# Patient Record
Sex: FEMALE | Race: WHITE | Employment: PART TIME | URBAN - METROPOLITAN AREA
[De-identification: names, ages, dates, MRNs, and addresses within clinical notes are randomized per-mention and may not be internally consistent; named-entity substitution may affect disease eponyms.]

---

## 2018-06-26 ENCOUNTER — HOSPITAL ENCOUNTER (OUTPATIENT)
Dept: GENERAL RADIOLOGY | Age: 58
Discharge: HOME | End: 2018-06-28
Payer: COMMERCIAL

## 2018-06-26 DIAGNOSIS — Z12.39 BREAST CANCER SCREENING: ICD-10-CM

## 2018-06-26 PROCEDURE — 77067 SCR MAMMO BI INCL CAD: CPT

## 2018-07-02 ENCOUNTER — HOSPITAL ENCOUNTER (OUTPATIENT)
Age: 58
Discharge: HOME | End: 2018-07-04
Payer: COMMERCIAL

## 2018-07-02 PROCEDURE — 88175 CYTOPATH C/V AUTO FLUID REDO: CPT

## 2019-06-07 ENCOUNTER — TELEPHONE (OUTPATIENT)
Dept: ORTHOPEDIC SURGERY | Age: 59
End: 2019-06-07

## 2019-06-07 DIAGNOSIS — R52 PAIN: Primary | ICD-10-CM

## 2019-06-10 ENCOUNTER — OFFICE VISIT (OUTPATIENT)
Dept: ORTHOPEDIC SURGERY | Age: 59
End: 2019-06-10
Payer: COMMERCIAL

## 2019-06-10 VITALS
HEIGHT: 61 IN | BODY MASS INDEX: 28.32 KG/M2 | SYSTOLIC BLOOD PRESSURE: 120 MMHG | DIASTOLIC BLOOD PRESSURE: 80 MMHG | HEART RATE: 81 BPM | TEMPERATURE: 97.8 F | RESPIRATION RATE: 18 BRPM | WEIGHT: 150 LBS

## 2019-06-10 DIAGNOSIS — M75.21 BICEPS TENDINITIS OF RIGHT UPPER EXTREMITY: Primary | ICD-10-CM

## 2019-06-10 PROCEDURE — 99203 OFFICE O/P NEW LOW 30 MIN: CPT | Performed by: ORTHOPAEDIC SURGERY

## 2019-06-10 NOTE — PATIENT INSTRUCTIONS
Patient Education        Biceps Tendinitis: Exercises  Your Care Instructions  Here are some examples of typical rehabilitation exercises for your condition. Start each exercise slowly. Ease off the exercise if you start to have pain. Your doctor or physical therapist will tell you when you can start these exercises and which ones will work best for you. How to do the exercises  Biceps stretch    1. Stand and hold your affected arm out to the side, with your hand at about hip level. 2. Gently bend your wrist back so that your fingers point down toward the floor. 3. You may also do this next to a wall and rest your fingers on the wall. 4. For more of a stretch, bend your head to the opposite side of your affected arm. 5. Hold for 15 to 30 seconds. 6. Repeat 2 to 4 times. Resisted supination    1. Sit leaning forward with your legs slightly spread. Then place your forearm on your thigh with your hand and affected wrist in front of your knee. 2. Grasp one end of an exercise band with your palm down, and step on the other end. 3. Keeping your wrist straight, roll your palm outward and away from your thigh for a count of 2, then slowly move your wrist back to the starting position to a count of 5.  4. Repeat 8 to 12 times. Resisted elbow flexion    1. Using your affected arm, hold one end of an elastic band in your hand. 2. Place the other end of the band under your foot on the same side of your body as your affected arm. 3. Slowly bend your elbow and bring your hand toward your shoulder. Your palm and the underside of your wrist should be facing up as you pull the band toward your shoulder. Count to 2 as you pull up. 4. Relax and slowly return to your starting position. Count to 5 as you return to the start. 5. Repeat 8 to 12 times. Resisted elbow flexion at shoulder level    1. Tie the ends of an exercise band together to form a loop.  Attach one end of the loop to a secure object or shut a door on

## 2019-06-10 NOTE — PROGRESS NOTES
Department of Orthopedic Surgery  History and Physical      CHIEF COMPLAINT:  Right arm pain    HISTORY OF PRESENT ILLNESS:                The patient is a RHD 62 y.o. female who presents with right arm pain. Patient states that she has had right arm pain for the last year. She states about a year ago she had a systemic cortisone injection by her primary care physician which took care of her pain. Her pain returned about 2 months ago where she received another systemic cortisone injection. She states her pain is under control at this point. She reports pain along her biceps tendon distally. She states when she has pain is very painful to flex and extend her elbow. She has worn a sling to help her discomfort when it is present. She denies numbness or tingling. She has no symptoms in the shoulder. Past Medical History:        Diagnosis Date    Hypertension      Past Surgical History:        Procedure Laterality Date    COLONOSCOPY  2012    Dr. Leticia Wasserman  2007     Current Medications:   No current facility-administered medications for this visit. Allergies:  Patient has no known allergies. Social History:   TOBACCO:   reports that she has never smoked. She has never used smokeless tobacco.  ETOH:   reports that she drinks about 2.4 oz of alcohol per week. DRUGS:   reports that she does not use drugs.   ACTIVITIES OF DAILY LIVING:    OCCUPATION:    Family History:       Problem Relation Age of Onset    Cancer Mother         breast cancer       REVIEW OF SYSTEMS:  CONSTITUTIONAL:  negative  EYES:  negative  HEENT:  negative  RESPIRATORY:  negative  CARDIOVASCULAR:  HTN  GASTROINTESTINAL:  negative  GENITOURINARY:  negative  INTEGUMENT/BREAST:  negative  HEMATOLOGIC/LYMPHATIC:  negative  ALLERGIC/IMMUNOLOGIC:  negative  ENDOCRINE:  negative  MUSCULOSKELETAL:  pain  NEUROLOGICAL:  negative  BEHAVIOR/PSYCH:  negative    PHYSICAL EXAM:    VITALS:  /80 (Site: Left Upper Arm, Position: Sitting)   Pulse 81   Temp 97.8 °F (36.6 °C) (Oral)   Resp 18   Ht 5' 1\" (1.549 m)   Wt 150 lb (68 kg)   BMI 28.34 kg/m²   CONSTITUTIONAL:  awake, alert, cooperative, no apparent distress, and appears stated age  EYES:  Lids and lashes normal, pupils equal, round and reactive to light, extra ocular muscles intact, sclera clear, conjunctiva normal  ENT:  Normocephalic, without obvious abnormality, atraumatic, sinuses nontender on palpation, external ears without lesions, oral pharynx with moist mucus membranes, tonsils without erythema or exudates, gums normal and good dentition. NECK:  Supple, symmetrical, trachea midline, no adenopathy, thyroid symmetric, not enlarged and no tenderness, skin normal  NEUROLOGIC:  Awake, alert, oriented to name, place and time. Cranial nerves II-XII are grossly intact. Motor is 5 out of 5 bilaterally. Sensory is intact.  gait is normal.  MUSCULOSKELETAL:    Right upper extremity: non-tender about the shoulder with full ROM. - drop arm test. Mild tenderness over the biceps tendon over the proximal humerus. Distal insertion intact. Full elbow ROM with no pain. - tenderness over the medial and lateral epicondyles. - tinels of the cubital tunnel. Median, ulnar, radial n intact to light touch. Brisk capillary refill. Gross motor 5/5. DATA:    CBC: No results found for: WBC, RBC, HGB, HCT, MCV, MCH, MCHC, RDW, PLT, MPV  PT/INR:  No results found for: PROTIME, INR    Radiology Review:  X-rays of the right shoulder were obtained today in the office and reviewed with the patient. 3 views: AP/scapular Y-view/axial demonstrate no acute fractures or dislocations. Mild GH arthritis. Previously healed fracture to the clavicle  Impression: no acute fractures or dislocations. X-rays of the right elbow were obtained today in the office and reviewed with the patient.  2 views: AP/lateral demonstrate no acute fractures or dislocations  Impression: no acute fractures or dislocations    IMPRESSION:  · Right arm tendinitis     PLAN:  Discussed findings with the patient. Patient states her steroid injection is still providing her relief. Recommended antiinflammatories and exercises as needed. Follow up PRN. I have seen and evaluated the patient and agree with the above assessment and plan on today's visit. I have performed the key components of the history and physical examination with significant findings of right biceps tendinitis. I concur with the findings and plan as documented.     Keli Licea MD  6/10/2019

## 2019-06-27 ENCOUNTER — HOSPITAL ENCOUNTER (OUTPATIENT)
Dept: GENERAL RADIOLOGY | Age: 59
Discharge: HOME | End: 2019-06-29
Payer: COMMERCIAL

## 2019-06-27 DIAGNOSIS — Z12.39 BREAST CANCER SCREENING: ICD-10-CM

## 2019-06-27 DIAGNOSIS — R92.2 DENSE BREAST: ICD-10-CM

## 2019-06-27 PROCEDURE — 76641 ULTRASOUND BREAST COMPLETE: CPT

## 2019-06-27 PROCEDURE — 77063 BREAST TOMOSYNTHESIS BI: CPT

## 2019-07-03 ENCOUNTER — HOSPITAL ENCOUNTER (OUTPATIENT)
Age: 59
Discharge: HOME | End: 2019-07-05
Payer: COMMERCIAL

## 2019-07-03 PROCEDURE — 88175 CYTOPATH C/V AUTO FLUID REDO: CPT

## 2019-07-03 PROCEDURE — 87624 HPV HI-RISK TYP POOLED RSLT: CPT

## 2019-07-12 LAB
HPV SAMPLE: NORMAL
HPV TYPE 16: NOT DETECTED
HPV TYPE 18: NOT DETECTED
HPV, HIGH RISK OTHER: NOT DETECTED
INTERPRETATION: NORMAL
SOURCE: NORMAL

## 2020-07-06 ENCOUNTER — HOSPITAL ENCOUNTER (OUTPATIENT)
Age: 60
Discharge: HOME | End: 2020-07-08
Payer: COMMERCIAL

## 2020-07-06 PROCEDURE — 88175 CYTOPATH C/V AUTO FLUID REDO: CPT

## 2020-07-30 ENCOUNTER — HOSPITAL ENCOUNTER (OUTPATIENT)
Dept: GENERAL RADIOLOGY | Age: 60
Discharge: HOME | End: 2020-08-01
Payer: COMMERCIAL

## 2020-07-30 PROCEDURE — 77063 BREAST TOMOSYNTHESIS BI: CPT

## 2021-08-02 ENCOUNTER — HOSPITAL ENCOUNTER (OUTPATIENT)
Dept: GENERAL RADIOLOGY | Age: 61
Discharge: HOME | End: 2021-08-04
Payer: COMMERCIAL

## 2021-08-02 DIAGNOSIS — Z12.31 ENCOUNTER FOR SCREENING MAMMOGRAM FOR BREAST CANCER: ICD-10-CM

## 2021-08-02 DIAGNOSIS — R92.2 DENSE BREAST TISSUE: ICD-10-CM

## 2021-08-02 PROCEDURE — 76641 ULTRASOUND BREAST COMPLETE: CPT

## 2021-08-02 PROCEDURE — 77067 SCR MAMMO BI INCL CAD: CPT

## 2022-07-11 ENCOUNTER — HOSPITAL ENCOUNTER (OUTPATIENT)
Dept: GENERAL RADIOLOGY | Age: 62
Discharge: HOME | End: 2022-07-13
Payer: COMMERCIAL

## 2022-07-11 DIAGNOSIS — Z91.89 AT HIGH RISK FOR BREAST CANCER: ICD-10-CM

## 2022-07-11 DIAGNOSIS — R92.2 DENSE BREAST TISSUE: ICD-10-CM

## 2022-07-11 DIAGNOSIS — R92.8 ABNORMAL MAMMOGRAM: ICD-10-CM

## 2022-07-11 DIAGNOSIS — R92.8 ABNORMAL FINDING ON BREAST IMAGING: Primary | ICD-10-CM

## 2022-07-11 DIAGNOSIS — Z12.31 ENCOUNTER FOR SCREENING MAMMOGRAM FOR BREAST CANCER: ICD-10-CM

## 2022-07-11 PROCEDURE — 76641 ULTRASOUND BREAST COMPLETE: CPT

## 2022-07-11 PROCEDURE — 76642 ULTRASOUND BREAST LIMITED: CPT

## 2022-07-11 PROCEDURE — 77063 BREAST TOMOSYNTHESIS BI: CPT

## 2022-07-14 ENCOUNTER — HOSPITAL ENCOUNTER (OUTPATIENT)
Dept: GENERAL RADIOLOGY | Age: 62
Discharge: HOME | End: 2022-07-16
Payer: COMMERCIAL

## 2022-07-14 DIAGNOSIS — R92.8 ABNORMAL FINDING ON BREAST IMAGING: ICD-10-CM

## 2022-07-14 PROCEDURE — A4648 IMPLANTABLE TISSUE MARKER: HCPCS

## 2022-07-14 PROCEDURE — 88305 TISSUE EXAM BY PATHOLOGIST: CPT

## 2022-07-14 PROCEDURE — 77065 DX MAMMO INCL CAD UNI: CPT

## 2022-07-14 PROCEDURE — 2500000003 HC RX 250 WO HCPCS

## 2022-07-14 NOTE — PROGRESS NOTES
Met with patient prior to her breast biopsy. Instructed on role of breast navigator and on breast biopsy procedure. Instructed that results will be available in approximately 3-5 days, patient would like called with results. Provided with folder containing breast navigator's contact information, monthly breast self exam card, and post biopsy discharge instructions. Instructed to call if she has any questions or concerns about her biopsy. Verbalizes understanding.

## 2022-07-20 ENCOUNTER — TELEPHONE (OUTPATIENT)
Dept: GENERAL RADIOLOGY | Age: 62
End: 2022-07-20

## 2022-07-20 NOTE — TELEPHONE ENCOUNTER
Shanda call to inquire about her recent right breast biopsy results which she saw in her [employer] account. Instructed that the pathology report from her breast biopsy indicates a benign finding. Instructed that the performing radiologist  will review her breast images and the pathology results for concordance. Instructed that she should plan on annual mammogram.  Instructed that we will notify her if her results are determined to be discordant or if other follow up recommendations are made. She verbalizes understanding.  Electronically signed by Madisyn Land RN, BSN on 7/20/2022 at 4:36 PM

## 2023-04-10 PROBLEM — M79.672 LEFT FOOT PAIN: Status: ACTIVE | Noted: 2023-04-10

## 2023-04-10 PROBLEM — R26.2 DIFFICULTY WALKING: Status: ACTIVE | Noted: 2023-04-10

## 2023-04-10 PROBLEM — M79.89 PALPABLE MASS OF SOFT TISSUE OF FOOT: Status: ACTIVE | Noted: 2023-04-10

## 2023-05-04 ENCOUNTER — CLINICAL DOCUMENTATION (OUTPATIENT)
Dept: GENERAL RADIOLOGY | Age: 63
End: 2023-05-04

## 2023-05-04 DIAGNOSIS — M79.621 AXILLARY PAIN, RIGHT: Primary | ICD-10-CM

## 2023-05-04 NOTE — PROGRESS NOTES
Patient called today stating she has right axillary pain. Put in orders for provider to co-sign, orders given to schedulers to move up imaging appointment.

## 2023-05-08 ENCOUNTER — OFFICE VISIT (OUTPATIENT)
Dept: PODIATRY | Age: 63
End: 2023-05-08
Payer: COMMERCIAL

## 2023-05-08 VITALS — WEIGHT: 144 LBS | BODY MASS INDEX: 27.19 KG/M2 | HEIGHT: 61 IN

## 2023-05-08 DIAGNOSIS — B35.3 TINEA PEDIS OF RIGHT FOOT: ICD-10-CM

## 2023-05-08 DIAGNOSIS — M79.89 PALPABLE MASS OF SOFT TISSUE OF FOOT: Primary | ICD-10-CM

## 2023-05-08 DIAGNOSIS — M79.672 LEFT FOOT PAIN: ICD-10-CM

## 2023-05-08 PROCEDURE — 99213 OFFICE O/P EST LOW 20 MIN: CPT | Performed by: PODIATRIST

## 2023-05-08 NOTE — PROGRESS NOTES
23     Shanda Abad    : 1960   Sex: female    Age: 58 y.o. Patient's PCP/Provider is:  Pilar Davenport MD    Subjective:  Patient is seen today for follow-up regarding continued care regarding painful soft tissue mass left great toe. Patient presents today to discuss MRI results. Patient also having some issues with chronic tinea issue right fourth webspace region. No other additional abnormalities noted. Chief Complaint   Patient presents with    Follow-up     MRI Results        ROS:  Const: Positives and pertinent negatives as per HPI. Musculo: Denies symptoms other than stated above. Neuro: Denies symptoms other than stated above. Skin: Denies symptoms other than stated above. Current Medications:    Current Outpatient Medications:     nystatin-triamcinolone (MYCOLOG II) 110703-3.1 UNIT/GM-% cream, Apply topically 2 times daily. , Disp: 60 g, Rfl: 3    BENICAR HCT 20-12.5 MG per tablet, Take 1 tablet by mouth daily, Disp: , Rfl:     Allergies:  No Known Allergies    Vitals:    23 1531   Weight: 144 lb (65.3 kg)   Height: 5' 1\" (1.549 m)       Exam:  Neurovascular status grossly intact bilateral foot. Palpable mass noted medial IPJ region left great toe with tenderness noted to palpation. No overlying hyperpigmentation, skin abrasion, or any signs of infection noted left great toe. Mild maceration noted fourth webspace right foot without skin fissuring or signs of bacterial infection noted. No edema noted right forefoot region. Diagnostic Studies:     XR FOOT LEFT (MIN 3 VIEWS)    Result Date: 2023  EXAMINATION: THREE XRAY VIEWS OF THE LEFT FOOT 4/10/2023 11:52 am COMPARISON: None. HISTORY: ORDERING SYSTEM PROVIDED HISTORY: Left foot pain TECHNOLOGIST PROVIDED HISTORY: Standing unless patient unable to stand FINDINGS: Three views of left foot reveal no evidence of acute bony abnormality. Minimal degenerative changes seen within the 1st metatarsophalangeal joint.

## 2023-06-30 ENCOUNTER — PREP FOR PROCEDURE (OUTPATIENT)
Dept: PODIATRY | Age: 63
End: 2023-06-30

## 2023-06-30 ENCOUNTER — TELEPHONE (OUTPATIENT)
Dept: PODIATRY | Age: 63
End: 2023-06-30

## 2023-06-30 PROBLEM — M79.675 PAIN OF LEFT GREAT TOE: Status: ACTIVE | Noted: 2023-06-30

## 2023-06-30 PROBLEM — M89.8X7 EXOSTOSIS OF TOE: Status: ACTIVE | Noted: 2023-06-30

## 2023-07-17 ENCOUNTER — HOSPITAL ENCOUNTER (OUTPATIENT)
Age: 63
Discharge: HOME | End: 2023-07-17
Payer: COMMERCIAL

## 2023-07-17 DIAGNOSIS — M79.675 PAIN OF LEFT GREAT TOE: ICD-10-CM

## 2023-07-17 LAB
ANION GAP SERPL CALCULATED.3IONS-SCNC: 9 MMOL/L (ref 7–16)
BUN SERPL-MCNC: 16 MG/DL (ref 6–23)
CALCIUM SERPL-MCNC: 9.5 MG/DL (ref 8.6–10.2)
CHLORIDE SERPL-SCNC: 103 MMOL/L (ref 98–107)
CO2 SERPL-SCNC: 27 MMOL/L (ref 22–29)
CREAT SERPL-MCNC: 0.8 MG/DL (ref 0.5–1)
GFR SERPL CREATININE-BSD FRML MDRD: >60 ML/MIN/1.73M2
GLUCOSE SERPL-MCNC: 93 MG/DL (ref 74–99)
POTASSIUM SERPL-SCNC: 4 MMOL/L (ref 3.5–5)
SODIUM SERPL-SCNC: 139 MMOL/L (ref 132–146)

## 2023-07-17 PROCEDURE — 36415 COLL VENOUS BLD VENIPUNCTURE: CPT

## 2023-07-17 PROCEDURE — 80048 BASIC METABOLIC PNL TOTAL CA: CPT

## 2023-07-19 ENCOUNTER — ANESTHESIA EVENT (OUTPATIENT)
Dept: OPERATING ROOM | Age: 63
End: 2023-07-19
Payer: COMMERCIAL

## 2023-07-19 RX ORDER — SODIUM CHLORIDE 0.9 % (FLUSH) 0.9 %
5-40 SYRINGE (ML) INJECTION EVERY 12 HOURS SCHEDULED
Status: CANCELLED | OUTPATIENT
Start: 2023-07-19

## 2023-07-19 RX ORDER — SODIUM CHLORIDE 0.9 % (FLUSH) 0.9 %
5-40 SYRINGE (ML) INJECTION PRN
Status: CANCELLED | OUTPATIENT
Start: 2023-07-19

## 2023-07-19 RX ORDER — SODIUM CHLORIDE 9 MG/ML
INJECTION, SOLUTION INTRAVENOUS PRN
Status: CANCELLED | OUTPATIENT
Start: 2023-07-19

## 2023-07-19 ASSESSMENT — LIFESTYLE VARIABLES: SMOKING_STATUS: 0

## 2023-07-20 ENCOUNTER — ANESTHESIA (OUTPATIENT)
Dept: OPERATING ROOM | Age: 63
End: 2023-07-20
Payer: COMMERCIAL

## 2023-07-20 ENCOUNTER — HOSPITAL ENCOUNTER (OUTPATIENT)
Age: 63
Setting detail: OUTPATIENT SURGERY
Discharge: HOME | End: 2023-07-20
Attending: PODIATRIST | Admitting: PODIATRIST
Payer: COMMERCIAL

## 2023-07-20 ENCOUNTER — HOSPITAL ENCOUNTER (OUTPATIENT)
Dept: OPERATING ROOM | Age: 63
Setting detail: OUTPATIENT SURGERY
Discharge: HOME | End: 2023-07-20
Attending: PODIATRIST
Payer: COMMERCIAL

## 2023-07-20 VITALS
TEMPERATURE: 97.1 F | RESPIRATION RATE: 16 BRPM | WEIGHT: 149 LBS | SYSTOLIC BLOOD PRESSURE: 98 MMHG | HEART RATE: 52 BPM | BODY MASS INDEX: 28.13 KG/M2 | DIASTOLIC BLOOD PRESSURE: 58 MMHG | OXYGEN SATURATION: 97 % | HEIGHT: 61 IN

## 2023-07-20 DIAGNOSIS — M89.8X7 EXOSTOSIS OF TOE: ICD-10-CM

## 2023-07-20 DIAGNOSIS — M79.675 PAIN OF LEFT GREAT TOE: ICD-10-CM

## 2023-07-20 DIAGNOSIS — M79.89 MASS OF SOFT TISSUE OF FOOT: ICD-10-CM

## 2023-07-20 DIAGNOSIS — M79.672 FOOT PAIN, LEFT: ICD-10-CM

## 2023-07-20 PROCEDURE — 7100000011 HC PHASE II RECOVERY - ADDTL 15 MIN: Performed by: PODIATRIST

## 2023-07-20 PROCEDURE — 2580000003 HC RX 258: Performed by: PODIATRIST

## 2023-07-20 PROCEDURE — 3700000001 HC ADD 15 MINUTES (ANESTHESIA): Performed by: PODIATRIST

## 2023-07-20 PROCEDURE — 3700000000 HC ANESTHESIA ATTENDED CARE: Performed by: PODIATRIST

## 2023-07-20 PROCEDURE — 7100000010 HC PHASE II RECOVERY - FIRST 15 MIN: Performed by: PODIATRIST

## 2023-07-20 PROCEDURE — 2709999900 HC NON-CHARGEABLE SUPPLY: Performed by: PODIATRIST

## 2023-07-20 PROCEDURE — 6360000002 HC RX W HCPCS: Performed by: PODIATRIST

## 2023-07-20 PROCEDURE — 3600000014 HC SURGERY LEVEL 4 ADDTL 15MIN: Performed by: PODIATRIST

## 2023-07-20 PROCEDURE — 6360000002 HC RX W HCPCS: Performed by: NURSE ANESTHETIST, CERTIFIED REGISTERED

## 2023-07-20 PROCEDURE — 2500000003 HC RX 250 WO HCPCS: Performed by: NURSE ANESTHETIST, CERTIFIED REGISTERED

## 2023-07-20 PROCEDURE — 2580000003 HC RX 258: Performed by: ANESTHESIOLOGY

## 2023-07-20 PROCEDURE — 3600000004 HC SURGERY LEVEL 4 BASE: Performed by: PODIATRIST

## 2023-07-20 PROCEDURE — 28124 PARTIAL REMOVAL OF TOE: CPT | Performed by: PODIATRIST

## 2023-07-20 PROCEDURE — 88304 TISSUE EXAM BY PATHOLOGIST: CPT

## 2023-07-20 PROCEDURE — 28041 EXC FOOT/TOE TUM DEP 1.5CM/>: CPT | Performed by: PODIATRIST

## 2023-07-20 RX ORDER — SODIUM CHLORIDE 9 MG/ML
INJECTION, SOLUTION INTRAVENOUS PRN
Status: DISCONTINUED | OUTPATIENT
Start: 2023-07-20 | End: 2023-07-20 | Stop reason: HOSPADM

## 2023-07-20 RX ORDER — BUPIVACAINE HYDROCHLORIDE 5 MG/ML
INJECTION, SOLUTION PERINEURAL PRN
Status: DISCONTINUED | OUTPATIENT
Start: 2023-07-20 | End: 2023-07-20 | Stop reason: ALTCHOICE

## 2023-07-20 RX ORDER — FENTANYL CITRATE 50 UG/ML
INJECTION, SOLUTION INTRAMUSCULAR; INTRAVENOUS PRN
Status: DISCONTINUED | OUTPATIENT
Start: 2023-07-20 | End: 2023-07-20 | Stop reason: SDUPTHER

## 2023-07-20 RX ORDER — PROPOFOL 10 MG/ML
INJECTION, EMULSION INTRAVENOUS CONTINUOUS PRN
Status: DISCONTINUED | OUTPATIENT
Start: 2023-07-20 | End: 2023-07-20 | Stop reason: SDUPTHER

## 2023-07-20 RX ORDER — HYDROCODONE BITARTRATE AND ACETAMINOPHEN 5; 325 MG/1; MG/1
1 TABLET ORAL EVERY 6 HOURS PRN
Qty: 12 TABLET | Refills: 0 | Status: SHIPPED | OUTPATIENT
Start: 2023-07-20 | End: 2023-07-23

## 2023-07-20 RX ORDER — SODIUM CHLORIDE, SODIUM LACTATE, POTASSIUM CHLORIDE, CALCIUM CHLORIDE 600; 310; 30; 20 MG/100ML; MG/100ML; MG/100ML; MG/100ML
INJECTION, SOLUTION INTRAVENOUS CONTINUOUS
Status: DISCONTINUED | OUTPATIENT
Start: 2023-07-20 | End: 2023-07-20 | Stop reason: HOSPADM

## 2023-07-20 RX ORDER — KETAMINE HYDROCHLORIDE 50 MG/ML
INJECTION, SOLUTION, CONCENTRATE INTRAMUSCULAR; INTRAVENOUS PRN
Status: DISCONTINUED | OUTPATIENT
Start: 2023-07-20 | End: 2023-07-20 | Stop reason: SDUPTHER

## 2023-07-20 RX ORDER — KETOROLAC TROMETHAMINE 30 MG/ML
INJECTION, SOLUTION INTRAMUSCULAR; INTRAVENOUS PRN
Status: DISCONTINUED | OUTPATIENT
Start: 2023-07-20 | End: 2023-07-20 | Stop reason: SDUPTHER

## 2023-07-20 RX ORDER — SODIUM CHLORIDE 0.9 % (FLUSH) 0.9 %
5-40 SYRINGE (ML) INJECTION PRN
Status: DISCONTINUED | OUTPATIENT
Start: 2023-07-20 | End: 2023-07-20 | Stop reason: HOSPADM

## 2023-07-20 RX ORDER — MIDAZOLAM HYDROCHLORIDE 1 MG/ML
INJECTION INTRAMUSCULAR; INTRAVENOUS PRN
Status: DISCONTINUED | OUTPATIENT
Start: 2023-07-20 | End: 2023-07-20 | Stop reason: SDUPTHER

## 2023-07-20 RX ORDER — SODIUM CHLORIDE 0.9 % (FLUSH) 0.9 %
5-40 SYRINGE (ML) INJECTION EVERY 12 HOURS SCHEDULED
Status: DISCONTINUED | OUTPATIENT
Start: 2023-07-20 | End: 2023-07-20 | Stop reason: HOSPADM

## 2023-07-20 RX ADMIN — KETAMINE HYDROCHLORIDE 20 MG: 50 INJECTION INTRAMUSCULAR; INTRAVENOUS at 10:33

## 2023-07-20 RX ADMIN — PROPOFOL INJECTABLE EMULSION 100 MCG/KG/MIN: 10 INJECTION, EMULSION INTRAVENOUS at 10:32

## 2023-07-20 RX ADMIN — SODIUM CHLORIDE, POTASSIUM CHLORIDE, SODIUM LACTATE AND CALCIUM CHLORIDE: 600; 310; 30; 20 INJECTION, SOLUTION INTRAVENOUS at 10:15

## 2023-07-20 RX ADMIN — FENTANYL CITRATE 25 MCG: 50 INJECTION INTRAMUSCULAR; INTRAVENOUS at 10:53

## 2023-07-20 RX ADMIN — KETOROLAC TROMETHAMINE 30 MG: 30 INJECTION, SOLUTION INTRAMUSCULAR; INTRAVENOUS at 10:57

## 2023-07-20 RX ADMIN — MIDAZOLAM 2 MG: 1 INJECTION INTRAMUSCULAR; INTRAVENOUS at 10:24

## 2023-07-20 RX ADMIN — CEFAZOLIN 2000 MG: 2 INJECTION, POWDER, FOR SOLUTION INTRAMUSCULAR; INTRAVENOUS at 10:31

## 2023-07-20 RX ADMIN — FENTANYL CITRATE 50 MCG: 50 INJECTION INTRAMUSCULAR; INTRAVENOUS at 10:30

## 2023-07-20 RX ADMIN — FENTANYL CITRATE 25 MCG: 50 INJECTION INTRAMUSCULAR; INTRAVENOUS at 10:57

## 2023-07-20 ASSESSMENT — PAIN - FUNCTIONAL ASSESSMENT: PAIN_FUNCTIONAL_ASSESSMENT: NONE - DENIES PAIN

## 2023-07-20 NOTE — DISCHARGE INSTRUCTIONS
DR. Jesse Flores AMBULATORY PROCEDURE DISCHARGE INSTRUCTIONS  You may be drowsy or lightheaded after receiving sedation or anesthesia. A responsible person should be with you for the next 24 hours. Please follow the instructions checked below:    DIET INSTRUCTIONS:  []Start with light diet and progress to your normal diet as you feel like eating. If you experience nausea or repeated episodes of vomiting which persist beyond 12-24 hours, notify your doctor. []Other     ACTIVITY INSTRUCTIONS:  [x]Rest today. Increase activity as tolerated    [x]Elevate operative limb   []Sling to operative limb  []No heavy lifting or strenuous activity     []No driving for ***   []Use crutches  []Use walker   [x]Weight bearing: []none /  [x]partial / []full as tolerated   []Other ***    WOUND/DRESSING INSTRUCTIONS:  Always ensure you and your care giver clean hands before and after caring for the wound. []May shower      []May bathe      [x]Keep dressing dry            [x]Do not remove dressing   []Remove dressing on     []Leave steri strips in place    []Drain care      [x] Ice to operative site for 15-30 minutes of each hour while awake for 24-36 hours  []Use ice cooling system as instructed                  [x]Post-op Shoe-wear when up and about                     []Other ***     MEDICATION INSTRUCTIONS:    [x]Prescriptions sent with you. Use as directed. When taking pain medications, you may experience dizziness or drowsiness. Do not drink alcohol or drive when taking these medications. [x]You may take a non-prescription anti-inflammatory medication as needed for pain relief. [x]Give the list of your medications to your primary care physician on your next visit. Keep your med list updated and carry it with in case of emergency  Other Instructions:                  FOLLOW-UP CARE:  [x]Call the office at [phone] with any questions or concerns prior to your scheduled postoperative appointment.   Watch for these

## 2023-07-20 NOTE — ANESTHESIA POSTPROCEDURE EVALUATION
Department of Anesthesiology  Postprocedure Note    Patient: Abigail Dan  MRN: [id number]  YOB: 1960  Date of evaluation: 7/20/2023      Procedure Summary     Date: 07/20/23 Room / Location: [address] [place] [address] 03 / [id number] Han Jones    Anesthesia Start: 1024 Anesthesia Stop: 1113    Procedure: RESECTION EXOSTOSIS  LEFT GREAT TOE, EXCISION SOFT TISSUE MASS LEFT GREAT TOE (CPT 92234) (Left) Diagnosis:       Mass of soft tissue of foot      Exostosis of toe      Pain of left great toe      (Mass of soft tissue of foot [M79.89])      (Exostosis of toe [M89.8X7])      (Pain of left great toe [M79.675])    Surgeons: Ed Mcfadden DPM Responsible Provider: Alejandra Ontiveros MD    Anesthesia Type: MAC ASA Status: 2          Anesthesia Type: MAC    Margaux Phase I: Margaux Score: 10    Margaux Phase II: Margaux Score: 9      Anesthesia Post Evaluation    Patient location during evaluation: PACU  Patient participation: complete - patient participated  Level of consciousness: awake  Airway patency: patent  Nausea & Vomiting: no nausea and no vomiting  Complications: no  Cardiovascular status: hemodynamically stable  Respiratory status: acceptable  Hydration status: stable

## 2023-07-20 NOTE — OP NOTE
Operative Note      Patient: Eden Mon  YOB: 1960  MRN: [id number]    Date of Procedure: 7/20/2023    Pre-Op Diagnosis Codes:     * Mass of soft tissue of foot [M79.89]     * Exostosis of toe [M89.8X7]     * Pain of left great toe [M79.675]    Post-Op Diagnosis: Same       Procedure(s):  RESECTION EXOSTOSIS  LEFT GREAT TOE, EXCISION SOFT TISSUE MASS LEFT GREAT TOE (CPT 91552)    Surgeon(s):  Doc Miller DPM    Assistant:   * No surgical staff found *    Anesthesia: Monitor Anesthesia Care    Estimated Blood Loss (mL): Minimal    Complications: None    Specimens:   ID Type Source Tests Collected by Time Destination   A : Exostosis of  LEFT GREAT TOE Tissue Tissue SURGICAL PATHOLOGY Doc Miller DPM 7/20/2023 1043        Implants:  * No implants in log *      Drains: * No LDAs found *    Findings: Chronic soft tissue mass left great toe        Detailed Description of Procedure:   After proper preoperative evaluation, patient were brought back to the operative room placed operative table in the supine position. Monitored anesthesia was ministered per anesthesia department. Patient did receive 2 g intravenous Ancef preoperatively. We did utilize a total of 10 cc of 0.5% Marcaine plain to anesthetize surgical site left foot. Tourniquet was placed around the patient's well-padded left ankle inflated to 200 at the much mercury then lowered to the surgical field once proper prepping and draping was performed. Attention was directed towards the dorsal/medial left IPJ region great toe at this time approximately a 2-1/2 cm linear incision was made. Incision was carried down through subcutaneous tissue layers, all vital structures were bovied and retracted as necessary. At this time we did locate the soft tissue mass which was associated with the plantar flexor hallucis longus tendon sheath.   The soft tissue mass was properly freed up of all soft tissue attachments measuring

## 2023-07-20 NOTE — H&P
Update History & Physical     The patient's History and Physical this morning was reviewed with the patient and there were no significant changes. I examined the patient and there were no significant changes from the previous History and Physical.     Plan: The risk, benefits, expected outcome, and alternative to the recommended procedure have been discussed with the patient. Patient understands and wants to proceed with the procedure. The procedure discussed is 1. Excision soft tissue mass left great toe  2.   Resection exostosis left great toe       Electronically signed by Carson Bhat DPM on 7/20/2023 at 10:23 AM

## 2023-07-24 ENCOUNTER — OFFICE VISIT (OUTPATIENT)
Dept: PODIATRY | Age: 63
End: 2023-07-24

## 2023-07-24 VITALS — HEIGHT: 61 IN | BODY MASS INDEX: 28.13 KG/M2 | WEIGHT: 149 LBS

## 2023-07-24 DIAGNOSIS — M89.8X7 EXOSTOSIS OF LEFT FOOT: ICD-10-CM

## 2023-07-24 DIAGNOSIS — M79.89 PALPABLE MASS OF SOFT TISSUE OF FOOT: Primary | ICD-10-CM

## 2023-07-24 PROCEDURE — 99024 POSTOP FOLLOW-UP VISIT: CPT | Performed by: PODIATRIST

## 2023-07-24 NOTE — PROGRESS NOTES
Patient is in today for post op of left foot. Patient says she is doing well.  Pcp is Madi Moscoso MD  Last ov 7/5/23

## 2023-07-25 NOTE — PROGRESS NOTES
23     Shanda Urbina First    : 1960   Sex: female    Age: 58 y.o. Patient's PCP/Provider is:  Marci Briggs MD    Subjective:  Patient is seen today for follow-up regarding continued care excision soft tissue mass left great toe with resection exostosis performed. Overall patient is doing well at this time with minimal issues noted. She has kept the dressing clean, dry, and intact as instructed. She denies any additional issues. Patient is wearing the offloading shoe as instructed. Chief Complaint   Patient presents with    Post-Op Check     Left foot        ROS:  Const: Positives and pertinent negatives as per HPI. Musculo: Denies symptoms other than stated above. Neuro: Denies symptoms other than stated above. Skin: Denies symptoms other than stated above. Current Medications:    Current Outpatient Medications:     nystatin-triamcinolone (MYCOLOG II) 352152-1.1 UNIT/GM-% cream, Apply topically 2 times daily. , Disp: 60 g, Rfl: 3    BENICAR HCT 20-12.5 MG per tablet, Take 1 tablet by mouth nightly, Disp: , Rfl:     Allergies: Allergies   Allergen Reactions    No Known Allergies        Vitals:    23 1501   Weight: 149 lb (67.6 kg)   Height: 5' 1\" (1.549 m)       Exam:  Neurovascular status unchanged. Surgical site stable with sutures intact left great toe. No signs of infection noted surgical site left great toe. Mild edema and ecchymotic skin changes present left great toe. Alignment maintained digital regions left foot. Stable gait noted with current offloading shoe intact left foot. Diagnostic Studies:     No results found. Procedures:    None    Plan Per Assessment  Shanda was seen today for post-op check. Diagnoses and all orders for this visit:    Palpable mass of soft tissue of foot    Exostosis of left foot      Postoperative evaluation and management  Surgical site evaluated left foot, dry dressing reapplied left lower extremity.   Patient was advised

## 2023-07-26 LAB — SURGICAL PATHOLOGY REPORT: NORMAL

## 2023-07-31 ENCOUNTER — TELEPHONE (OUTPATIENT)
Dept: GENERAL RADIOLOGY | Age: 63
End: 2023-07-31

## 2023-07-31 ENCOUNTER — NURSE ONLY (OUTPATIENT)
Dept: PODIATRY | Age: 63
End: 2023-07-31

## 2023-07-31 VITALS — HEIGHT: 61 IN | WEIGHT: 149 LBS | BODY MASS INDEX: 28.13 KG/M2

## 2023-07-31 DIAGNOSIS — R26.2 DIFFICULTY WALKING: ICD-10-CM

## 2023-07-31 DIAGNOSIS — M79.89 PALPABLE MASS OF SOFT TISSUE OF FOOT: Primary | ICD-10-CM

## 2023-07-31 DIAGNOSIS — M79.675 PAIN OF TOE OF LEFT FOOT: ICD-10-CM

## 2023-07-31 PROCEDURE — 99024 POSTOP FOLLOW-UP VISIT: CPT | Performed by: PODIATRIST

## 2023-07-31 NOTE — TELEPHONE ENCOUNTER
Returned call to Shanda. Patient wants to schedule her MRI for December. Per [facility] RN  we need to get a prior auth. [facility] has the order. Patient advised to call back in [facility] November. Patient verbalized understanding.

## 2023-07-31 NOTE — PROGRESS NOTES
Patient is in today for 1 week post op of left great toe. Patient says her pain is a lot less and she is doing well.  Pcp is Herminia Villasenor MD  Last ov 7/5/23

## 2023-08-07 ENCOUNTER — OFFICE VISIT (OUTPATIENT)
Dept: PODIATRY | Age: 63
End: 2023-08-07

## 2023-08-07 VITALS — BODY MASS INDEX: 28.13 KG/M2 | HEIGHT: 61 IN | WEIGHT: 149 LBS

## 2023-08-07 DIAGNOSIS — M79.89 PALPABLE MASS OF SOFT TISSUE OF FOOT: Primary | ICD-10-CM

## 2023-08-07 DIAGNOSIS — M89.8X7 EXOSTOSIS OF LEFT FOOT: ICD-10-CM

## 2023-08-07 PROCEDURE — 99024 POSTOP FOLLOW-UP VISIT: CPT | Performed by: PODIATRIST

## 2023-08-07 NOTE — PROGRESS NOTES
Patient is in today for 1 week post op of left great toe. Patient says everything is fine and no issues.  Pcp is Marci Briggs MD  Last ov 7/5/23

## 2023-08-08 NOTE — PROGRESS NOTES
23     Shanda Caban    : 1960   Sex: female    Age: 58 y.o. Patient's PCP/Provider is:  Darby Clark MD    Subjective:  Patient is seen today for follow-up regarding continued care excision soft tissue mass, resection exostosis left great toe. Overall patient is doing well with minimal issues noted. She denies any nausea, vomit, fever, chills. Patient currently with surgical outcome at this point in time. No other abnormalities noted. Chief Complaint   Patient presents with    Post-Op Check     Left great toe        ROS:  Const: Positives and pertinent negatives as per HPI. Musculo: Denies symptoms other than stated above. Neuro: Denies symptoms other than stated above. Skin: Denies symptoms other than stated above. Current Medications:    Current Outpatient Medications:     nystatin-triamcinolone (MYCOLOG II) 554626-1.1 UNIT/GM-% cream, Apply topically 2 times daily. , Disp: 60 g, Rfl: 3    BENICAR HCT 20-12.5 MG per tablet, Take 1 tablet by mouth nightly, Disp: , Rfl:     Allergies: Allergies   Allergen Reactions    No Known Allergies        Vitals:    23 1153   Weight: 149 lb (67.6 kg)   Height: 5' 1\" (1.549 m)       Exam:  Neurovascular status unchanged. Surgical site stable with sutures intact left great toe. No edema, ecchymosis, or any signs of infection noted left great toe. Alignment maintained left great toe. Diagnostic Studies:     No results found. Procedures:    None    Plan Per Assessment  Shanda was seen today for post-op check. Diagnoses and all orders for this visit:    Palpable mass of soft tissue of foot    Exostosis of left foot      Postoperative evaluation and management  We did discuss continued skin care options with patient in detail today. Sutures were removed from surgical site left great toe to patient tolerance. Patient was advised to resume normal daily activities to tolerance.   Patient will be followed by later date if any

## 2023-10-19 ENCOUNTER — CLINICAL DOCUMENTATION (OUTPATIENT)
Dept: GENERAL RADIOLOGY | Age: 63
End: 2023-10-19

## 2023-10-19 DIAGNOSIS — Z01.812 BLOOD TESTS PRIOR TO TREATMENT OR PROCEDURE: Primary | ICD-10-CM

## 2023-10-19 NOTE — PROGRESS NOTES
Spoke with patient regarding breast MRI. Patient requests to have the MRI in December. She will need labs, orders in [facility].

## 2023-10-20 ENCOUNTER — OFFICE VISIT (OUTPATIENT)
Dept: PODIATRY | Age: 63
End: 2023-10-20
Payer: COMMERCIAL

## 2023-10-20 VITALS — HEIGHT: 60 IN | WEIGHT: 149 LBS | BODY MASS INDEX: 29.25 KG/M2

## 2023-10-20 DIAGNOSIS — B35.3 TINEA PEDIS OF RIGHT FOOT: Primary | ICD-10-CM

## 2023-10-20 DIAGNOSIS — L84 CORNS AND CALLUS: ICD-10-CM

## 2023-10-20 DIAGNOSIS — M20.41 HAMMERTOE OF RIGHT FOOT: ICD-10-CM

## 2023-10-20 DIAGNOSIS — R26.2 DIFFICULTY WALKING: ICD-10-CM

## 2023-10-20 PROCEDURE — 99213 OFFICE O/P EST LOW 20 MIN: CPT | Performed by: PODIATRIST

## 2023-10-20 NOTE — PROGRESS NOTES
Patient is here today for a follow up to right 5th toe fungus. She reports now there is a lump developing in between 4th and 5th toes. The area is red and swelling. PCP is Dr. Carla Kelley, last seen 07/05/2023.

## 2023-10-20 NOTE — PROGRESS NOTES
10/20/23     Shanda Austin    : 1960   Sex: female    Age: 61 y.o. Patient's PCP/Provider is:  Radha Swanson MD    Subjective:  Patient is seen today for evaluation regarding patient regarding painful interdigital corn right foot fourth intermetatarsal space. Patient stated issues have been going on for several months and progressively getting worse. She did want to discuss additional treatment options available at this time for care. No other additional abnormalities noted. Chief Complaint   Patient presents with    Toe Pain     Right 5th toe fungus       ROS:  Const: Positives and pertinent negatives as per HPI. Musculo: Denies symptoms other than stated above. Neuro: Denies symptoms other than stated above. Skin: Denies symptoms other than stated above. Current Medications:    Current Outpatient Medications:     nystatin-triamcinolone (MYCOLOG II) 773466-0.1 UNIT/GM-% cream, Apply topically 2 times daily. , Disp: 60 g, Rfl: 3    BENICAR HCT 20-12.5 MG per tablet, Take 1 tablet by mouth nightly, Disp: , Rfl:     Allergies: Allergies   Allergen Reactions    No Known Allergies        Vitals:    10/20/23 0702   Weight: 67.6 kg (149 lb)   Height: 1.524 m (5')       Exam:  Neurovascular status grossly intact right foot. Tinea pedis issues stable right foot. Painful corn/callus noted fourth webspace right foot. Upon partial thickness debridement underlying ulceration or any signs of infection noted right foot. Adequate range of motion noted digital regions right foot. Diagnostic Studies:     No results found. Procedures:    None    Plan Per Assessment  Shanda was seen today for toe pain. Diagnoses and all orders for this visit:    Tinea pedis of right foot    Corns and callus    Hammertoe of right foot    Difficulty walking      Evaluation and management  We did discuss conservative care options at this time.   Partial-thickness debridement corn/callus performed right foot to

## 2023-11-30 ENCOUNTER — HOSPITAL ENCOUNTER (OUTPATIENT)
Age: 63
Discharge: HOME | End: 2023-11-30
Payer: COMMERCIAL

## 2023-11-30 DIAGNOSIS — Z01.812 BLOOD TESTS PRIOR TO TREATMENT OR PROCEDURE: ICD-10-CM

## 2023-11-30 LAB
BUN SERPL-MCNC: 15 MG/DL (ref 6–23)
CREAT SERPL-MCNC: 0.7 MG/DL (ref 0.5–1)
GFR SERPL CREATININE-BSD FRML MDRD: >60 ML/MIN/1.73M2

## 2023-11-30 PROCEDURE — 36415 COLL VENOUS BLD VENIPUNCTURE: CPT

## 2023-11-30 PROCEDURE — 82565 ASSAY OF CREATININE: CPT

## 2023-11-30 PROCEDURE — 84520 ASSAY OF UREA NITROGEN: CPT

## 2024-01-04 ENCOUNTER — HOSPITAL ENCOUNTER (OUTPATIENT)
Age: 64
Discharge: HOME | End: 2024-01-04
Payer: COMMERCIAL

## 2024-01-04 DIAGNOSIS — Z01.818 PREPROCEDURAL EXAMINATION: ICD-10-CM

## 2024-01-04 LAB
ALBUMIN SERPL-MCNC: 4.4 G/DL (ref 3.5–5.2)
ALP SERPL-CCNC: 99 U/L (ref 35–104)
ALT SERPL-CCNC: 15 U/L (ref 0–32)
ANION GAP SERPL CALCULATED.3IONS-SCNC: 11 MMOL/L (ref 7–16)
AST SERPL-CCNC: 19 U/L (ref 0–31)
BILIRUB SERPL-MCNC: 0.6 MG/DL (ref 0–1.2)
BUN SERPL-MCNC: 19 MG/DL (ref 6–23)
CALCIUM SERPL-MCNC: 9.5 MG/DL (ref 8.6–10.2)
CHLORIDE SERPL-SCNC: 100 MMOL/L (ref 98–107)
CO2 SERPL-SCNC: 28 MMOL/L (ref 22–29)
CREAT SERPL-MCNC: 0.8 MG/DL (ref 0.5–1)
GFR SERPL CREATININE-BSD FRML MDRD: >60 ML/MIN/1.73M2
GLUCOSE SERPL-MCNC: 97 MG/DL (ref 74–99)
POTASSIUM SERPL-SCNC: 3.9 MMOL/L (ref 3.5–5)
PROT SERPL-MCNC: 7.7 G/DL (ref 6.4–8.3)
SODIUM SERPL-SCNC: 139 MMOL/L (ref 132–146)

## 2024-01-04 PROCEDURE — 80053 COMPREHEN METABOLIC PANEL: CPT

## 2024-01-04 PROCEDURE — 36415 COLL VENOUS BLD VENIPUNCTURE: CPT

## 2024-01-08 ENCOUNTER — HOSPITAL ENCOUNTER (OUTPATIENT)
Dept: CT IMAGING | Age: 64
Discharge: HOME | End: 2024-01-10
Payer: COMMERCIAL

## 2024-01-08 DIAGNOSIS — R91.8 RIGHT LOWER LOBE LUNG MASS: ICD-10-CM

## 2024-01-08 PROCEDURE — 71260 CT THORAX DX C+: CPT

## 2024-01-08 PROCEDURE — 2580000003 HC RX 258: Performed by: RADIOLOGY

## 2024-01-08 PROCEDURE — 6360000004 HC RX CONTRAST MEDICATION: Performed by: RADIOLOGY

## 2024-01-08 RX ORDER — SODIUM CHLORIDE 0.9 % (FLUSH) 0.9 %
10 SYRINGE (ML) INJECTION PRN
Status: DISCONTINUED | OUTPATIENT
Start: 2024-01-08 | End: 2024-01-11 | Stop reason: HOSPADM

## 2024-01-08 RX ADMIN — IOPAMIDOL 75 ML: 755 INJECTION, SOLUTION INTRAVENOUS at 14:35

## 2024-01-08 RX ADMIN — SODIUM CHLORIDE, PRESERVATIVE FREE 10 ML: 5 INJECTION INTRAVENOUS at 14:35

## 2024-03-27 ENCOUNTER — TELEPHONE (OUTPATIENT)
Dept: SURGERY | Age: 64
End: 2024-03-27

## 2024-03-27 ENCOUNTER — OFFICE VISIT (OUTPATIENT)
Dept: SURGERY | Age: 64
End: 2024-03-27
Payer: COMMERCIAL

## 2024-03-27 VITALS
HEART RATE: 69 BPM | SYSTOLIC BLOOD PRESSURE: 128 MMHG | OXYGEN SATURATION: 98 % | DIASTOLIC BLOOD PRESSURE: 89 MMHG | TEMPERATURE: 98.5 F | HEIGHT: 60 IN | BODY MASS INDEX: 29.21 KG/M2 | WEIGHT: 148.8 LBS

## 2024-03-27 DIAGNOSIS — Z12.11 ENCOUNTER FOR SCREENING COLONOSCOPY: Primary | ICD-10-CM

## 2024-03-27 DIAGNOSIS — Z12.11 SPECIAL SCREENING FOR MALIGNANT NEOPLASMS, COLON: ICD-10-CM

## 2024-03-27 PROCEDURE — S0285 CNSLT BEFORE SCREEN COLONOSC: HCPCS | Performed by: SURGERY

## 2024-03-27 RX ORDER — MELOXICAM 15 MG/1
TABLET ORAL
COMMUNITY

## 2024-03-27 RX ORDER — HYDROCODONE BITARTRATE AND ACETAMINOPHEN 5; 325 MG/1; MG/1
TABLET ORAL
COMMUNITY

## 2024-03-27 RX ORDER — DICLOFENAC SODIUM 75 MG/1
TABLET, DELAYED RELEASE ORAL
COMMUNITY
Start: 2019-04-08

## 2024-03-27 RX ORDER — M-VIT,TX,IRON,MINS/CALC/FOLIC 27MG-0.4MG
1 TABLET ORAL DAILY
COMMUNITY

## 2024-03-27 RX ORDER — TRIAMCINOLONE ACETONIDE 40 MG/ML
INJECTION, SUSPENSION INTRA-ARTICULAR; INTRAMUSCULAR
COMMUNITY
Start: 2019-04-08

## 2024-03-27 RX ORDER — PREDNISOLONE ACETATE 10 MG/ML
SUSPENSION/ DROPS OPHTHALMIC
COMMUNITY

## 2024-03-27 NOTE — TELEPHONE ENCOUNTER
Prior Authorization Form:      DEMOGRAPHICS:                     Patient Name:  Shanda Magaña  Patient :  1960            Insurance:  Payor: BCBS / Plan: [place] - [place] PPO / Product Type: *No Product type* /   Insurance ID Number:    Payer/Plan Subscr  Sex Relation Sub. Ins. ID Effective Group Num   1. BCBS - BCBS -* SHANDA MAGAÑA 1960 Female Self [id number]                                   [id number]         DIAGNOSIS & PROCEDURE:                       Procedure/Operation: screening colonoscopy           CPT Code: 03798    Diagnosis:  screening colonoscopy    ICD10 Code: z12.11    Location:  [facility]    Surgeon:  Dr Alston    SCHEDULING INFORMATION:                          Date: 06/10/2024    Time: 7:30am              Anesthesia:  MAC/TIVA                                                       Status:  Outpatient        Special Comments:         Electronically signed by Dolores Peterson MA on 3/27/2024 at 10:29 AM

## 2024-03-27 NOTE — PROGRESS NOTES
Patient's Name/Date of Birth: Shanda Magaña / 1960    Date: 3/27/2024    PCP: Benoit Guerrero MD    Chief Complaint   Patient presents with    Follow-up    Colonoscopy     7 year colon recall        HPI:  Patient seen for evaluation for screening colonoscopy. Denies abdominal pain, no recent change in bowel habits, no rectal bleeding. No family Hx of CRC. Family Hx oc breast cancer and pancreatic cancer    Patient's medications, allergies, past medical, surgical, social and family histories were reviewed and updated as appropriate.    Allergies   Allergen Reactions    No Known Allergies        Past Medical History:   Diagnosis Date    COVID-19 01/2023    HA  fatigue cold & cough  took paxlovid    Hyperlipidemia     Hypertension         Past Surgical History:   Procedure Laterality Date    COLONOSCOPY  2012    Dr. Alston    ENDOMETRIAL ABLATION  2007    HEEL SPUR SURGERY Left 7/20/2023    RESECTION EXOSTOSIS  LEFT GREAT TOE, EXCISION SOFT TISSUE MASS LEFT GREAT TOE (CPT 84758) performed by DEANNE Vaca Jr.M at [facility] BREAST BIOPSY W LOC DEVICE 1ST LESION RIGHT Right 7/14/2022    US BREAST NEEDLE BIOPSY RIGHT 7/14/2022 [facility]        Social History     Tobacco Use    Smoking status: Never    Smokeless tobacco: Never   Substance Use Topics    Alcohol use: Yes     Alcohol/week: 4.0 standard drinks of alcohol     Types: 4 Glasses of wine per week     Comment: occas       Current Outpatient Medications   Medication Sig Dispense Refill    Multiple Vitamins-Minerals (THERAPEUTIC MULTIVITAMIN-MINERALS) tablet Take 1 tablet by mouth daily      BENICAR HCT 20-12.5 MG per tablet Take 1 tablet by mouth nightly      meloxicam (MOBIC) 15 MG tablet meloxicam 15 mg tablet (Patient not taking: Reported on 3/27/2024)      diclofenac (VOLTAREN) 75 MG EC tablet Take 1 tablet twice a day by oral route for 30 days. (Patient not taking: Reported on 3/27/2024)      HYDROcodone-acetaminophen (NORCO)

## 2024-04-26 PROBLEM — Z12.11 SPECIAL SCREENING FOR MALIGNANT NEOPLASMS, COLON: Status: RESOLVED | Noted: 2024-03-27 | Resolved: 2024-04-26

## 2024-06-04 RX ORDER — CYANOCOBALAMIN (VITAMIN B-12) 500 MCG
TABLET ORAL
COMMUNITY

## 2024-06-04 RX ORDER — CALCIUM CARBONATE 500(1250)
500 TABLET ORAL DAILY
COMMUNITY

## 2024-06-04 RX ORDER — DIAPER,BRIEF,ADULT, DISPOSABLE
EACH MISCELLANEOUS DAILY
COMMUNITY

## 2024-06-04 NOTE — PROGRESS NOTES
[facility] PRE-ADMISSION TESTING INSTRUCTIONS    The Preadmission Testing patient is instructed accordingly using the following criteria (check applicable):    ARRIVAL INSTRUCTIONS:  [x] Parking the day of Surgery is located in the Main Entrance lot.  Upon entering the door, make an immediate right to the surgery reception desk    [x] Bring photo ID and insurance card    [] Bring in a copy of Living will or Durable Power of  papers.    [x] Please be sure to arrange for responsible adult to provide transportation to and from the hospital    [x] Please arrange for responsible adult to be with you for the 24 hour period post procedure due to having anesthesia    [x] If you awake am of surgery not feeling well or have temperature >100 please call [phone]    GENERAL INSTRUCTIONS:    [x] May have clear liquids until 2 hours prior to surgery. Examples include water, fruit juices (no pulp), jello, popsicles, black coffee or tea, beef or chicken broth.       May have light meal 6 hours prior to surgery. Example include toast and clear liquids.        No gum, candy or mints.    [x] You may brush your teeth, but do not swallow any water    [x] Take medications as instructed with 1-2 oz of water    [x] Stop herbal supplements and vitamins 5 days prior to procedure    [x] Follow preop dosing of blood thinners per physician instructions    [] Take 1/2 dose of evening insulin, but no insulin after midnight    [] No oral diabetic medications after midnight    [] If diabetic and have low blood sugar or feel symptomatic, take 1-2oz apple juice only    [] Bring inhalers day of surgery    [] Bring C-PAP/ Bi-Pap day of surgery    [] Bring urine specimen day of surgery    [x] Shower or bath with soap, lather and rinse well, AM of Surgery, no lotion, powders or creams to surgical site    [x] Follow bowel prep as instructed per surgeon    [x] No tobacco products within 24 hours of surgery     [x] No

## 2024-06-09 ENCOUNTER — ANESTHESIA EVENT (OUTPATIENT)
Dept: ENDOSCOPY | Age: 64
End: 2024-06-09
Payer: COMMERCIAL

## 2024-06-09 NOTE — ANESTHESIA PRE PROCEDURE
Allergen Reactions    No Known Allergies        Problem List:    Patient Active Problem List   Diagnosis Code    HTN (hypertension) I10    Fibrocystic breast disease N60.19    History of breast cancer in female - Mom(age 75), Sister(age 52)  Z85.3    Mass of soft tissue of foot M79.89    Left foot pain M79.672    Difficulty walking R26.2    Exostosis of toe M89.8X7    Pain of left great toe M79.675       Past Medical History:        Diagnosis Date    COVID-19 01/2023    HA  fatigue cold & cough  took paxlovid    Hyperlipidemia     Hypertension        Past Surgical History:        Procedure Laterality Date    COLONOSCOPY  2012    Dr. Alston    ENDOMETRIAL ABLATION  2007    HEEL SPUR SURGERY Left 7/20/2023    RESECTION EXOSTOSIS  LEFT GREAT TOE, EXCISION SOFT TISSUE MASS LEFT GREAT TOE (CPT 57579) performed by DEANNE Vaca Jr[facility]M at [facility] OR     BREAST BIOPSY W LOC DEVICE 1ST LESION RIGHT Right 7/14/2022    US BREAST NEEDLE BIOPSY RIGHT 7/14/2022 [facility]       Social History:    Social History     Tobacco Use    Smoking status: Never    Smokeless tobacco: Never   Substance Use Topics    Alcohol use: Yes     Alcohol/week: 4.0 standard drinks of alcohol     Types: 4 Glasses of wine per week     Comment: occas                                Counseling given: Not Answered      Vital Signs (Current):   Vitals:    06/04/24 1404   Weight: 65.8 kg (145 lb)   Height: 1.549 m (5' 1\")                                              BP Readings from Last 3 Encounters:   03/27/24 128/89   07/20/23 (!) 98/58   07/17/23 130/82       NPO Status:                                                                                 BMI:   Wt Readings from Last 3 Encounters:   03/27/24 67.5 kg (148 lb 12.8 oz)   10/20/23 67.6 kg (149 lb)   08/07/23 67.6 kg (149 lb)     Body mass index is 27.4 kg/m².    CBC: No results found for: \"WBC\", \"RBC\", \"HGB\", \"HCT\", \"MCV\", \"RDW\", \"PLT\"    CMP:   Lab Results   Component Value

## 2024-06-10 ENCOUNTER — HOSPITAL ENCOUNTER (OUTPATIENT)
Age: 64
Setting detail: OUTPATIENT SURGERY
Discharge: HOME | End: 2024-06-10
Attending: SURGERY | Admitting: SURGERY
Payer: COMMERCIAL

## 2024-06-10 ENCOUNTER — ANESTHESIA (OUTPATIENT)
Dept: ENDOSCOPY | Age: 64
End: 2024-06-10
Payer: COMMERCIAL

## 2024-06-10 VITALS
RESPIRATION RATE: 16 BRPM | SYSTOLIC BLOOD PRESSURE: 111 MMHG | OXYGEN SATURATION: 95 % | TEMPERATURE: 97.3 F | BODY MASS INDEX: 27.38 KG/M2 | HEART RATE: 62 BPM | DIASTOLIC BLOOD PRESSURE: 58 MMHG | HEIGHT: 61 IN | WEIGHT: 145 LBS

## 2024-06-10 PROCEDURE — 3700000001 HC ADD 15 MINUTES (ANESTHESIA): Performed by: SURGERY

## 2024-06-10 PROCEDURE — 7100000010 HC PHASE II RECOVERY - FIRST 15 MIN: Performed by: SURGERY

## 2024-06-10 PROCEDURE — 7100000011 HC PHASE II RECOVERY - ADDTL 15 MIN: Performed by: SURGERY

## 2024-06-10 PROCEDURE — 3700000000 HC ANESTHESIA ATTENDED CARE: Performed by: SURGERY

## 2024-06-10 PROCEDURE — 45378 DIAGNOSTIC COLONOSCOPY: CPT | Performed by: SURGERY

## 2024-06-10 PROCEDURE — 2709999900 HC NON-CHARGEABLE SUPPLY: Performed by: SURGERY

## 2024-06-10 PROCEDURE — 2580000003 HC RX 258: Performed by: SURGERY

## 2024-06-10 PROCEDURE — 6360000002 HC RX W HCPCS: Performed by: NURSE ANESTHETIST, CERTIFIED REGISTERED

## 2024-06-10 PROCEDURE — 3609027000 HC COLONOSCOPY: Performed by: SURGERY

## 2024-06-10 RX ORDER — SODIUM CHLORIDE 9 MG/ML
INJECTION, SOLUTION INTRAVENOUS CONTINUOUS
Status: DISCONTINUED | OUTPATIENT
Start: 2024-06-10 | End: 2024-06-10 | Stop reason: HOSPADM

## 2024-06-10 RX ORDER — SODIUM CHLORIDE 0.9 % (FLUSH) 0.9 %
5-40 SYRINGE (ML) INJECTION PRN
Status: DISCONTINUED | OUTPATIENT
Start: 2024-06-10 | End: 2024-06-10 | Stop reason: HOSPADM

## 2024-06-10 RX ORDER — SODIUM CHLORIDE 9 MG/ML
25 INJECTION, SOLUTION INTRAVENOUS PRN
Status: DISCONTINUED | OUTPATIENT
Start: 2024-06-10 | End: 2024-06-10 | Stop reason: HOSPADM

## 2024-06-10 RX ORDER — SODIUM CHLORIDE 0.9 % (FLUSH) 0.9 %
5-40 SYRINGE (ML) INJECTION EVERY 12 HOURS SCHEDULED
Status: DISCONTINUED | OUTPATIENT
Start: 2024-06-10 | End: 2024-06-10 | Stop reason: HOSPADM

## 2024-06-10 RX ORDER — PROPOFOL 10 MG/ML
INJECTION, EMULSION INTRAVENOUS PRN
Status: DISCONTINUED | OUTPATIENT
Start: 2024-06-10 | End: 2024-06-10 | Stop reason: SDUPTHER

## 2024-06-10 RX ADMIN — PROPOFOL 230 MG: 10 INJECTION, EMULSION INTRAVENOUS at 07:31

## 2024-06-10 RX ADMIN — SODIUM CHLORIDE: 9 INJECTION, SOLUTION INTRAVENOUS at 07:00

## 2024-06-10 ASSESSMENT — PAIN - FUNCTIONAL ASSESSMENT: PAIN_FUNCTIONAL_ASSESSMENT: 0-10

## 2024-06-10 NOTE — PROCEDURES
[place] [address]                               COLONOSCOPY      PATIENT NAME: JAMAL WHITTINGTON                : 1960  MED REC NO: [id number]                        ROOM: [facility] ROOM  ACCOUNT NO: [id number]                       ADMIT DATE: 06/10/2024  PROVIDER: Griselda Alston MD    OPERATIVE REPORT    DATE OF PROCEDURE:  06/10/2024      PREOPERATIVE DIAGNOSIS:  Screening colonoscopy.    POSTOPERATIVE DIAGNOSIS:  Normal colonoscopy.    PROCEDURE:  Total colonoscopy.    ESTIMATED BLOOD LOSS:  None.    OPERATIVE FINDINGS:  Few diverticula in the sigmoid colon, otherwise normal colonoscopy.    DESCRIPTION OF PROCEDURE:  With the patient in the left lateral position on the operating table, after adequate sedation obtained by Anesthesia, a digital rectal examination and dilatation were performed, showed evidence of good sphincter tone.  There were no palpable masses and no blood on examining finger.  A standard Olympus colonoscope was introduced through the anal opening, advanced into the rectosigmoid.  The anus and rectum were normal.  Sigmoid colon showed evidence of few red diverticula with no other pathology.  Remainder of the left colon visualized was normal.  Transverse colon, ascending colon, and cecum as well as ileocecal valve and appendiceal opening were each visualized, were normal.  Photos were taken.  The scope was slowly withdrawn.  Further observation of the colon on the way out revealed no other pathology.  The scope was totally removed.  The patient tolerated the procedure well.    RECOMMENDATIONS:    1. High-fiber diet.  2. Repeat colonoscopy in 10 years or earlier if indicated.          GRISELDA ALSTON MD    D:  06/10/2024 07:54:21     T:  06/10/2024 09:33:01     MA/MACO  Job #:  [id number]     Doc#:  [id number]

## 2024-06-10 NOTE — BRIEF OP NOTE
Brief Postoperative Note      Patient: Shanda Magaña  YOB: 1960  MRN: [id number]    Date of Procedure: 6/10/2024    Pre-Op Diagnosis Codes:     * Special screening for malignant neoplasms, colon [Z12.11]    Post-Op Diagnosis: Same       Procedure(s):  COLORECTAL CANCER SCREENING, NOT HIGH RISK    Surgeon(s):  Griselda Alston MD    Assistant:  * No surgical staff found *    Anesthesia: Monitor Anesthesia Care    Estimated Blood Loss (mL): Minimal    Complications: None    Specimens:   * No specimens in log *    Implants:  * No implants in log *      Drains: * No LDAs found *    Findings:  Infection Present At Time Of Surgery (PATOS) (choose all levels that have infection present):  No infection present  Other Findings:     Electronically signed by Griselda Alston MD on 6/10/2024 at 7:49 AM

## 2024-06-10 NOTE — DISCHARGE INSTRUCTIONS
[facility] AMBULATORY PROCEDURE DISCHARGE INSTRUCTIONS  You may be drowsy or lightheaded after receiving sedation or anesthesia.    A responsible person should be with you for the next 24 hours.    Please follow the instructions checked below:    DIET INSTRUCTIONS:  [x]Start with light diet and progress to your normal diet as you feel like eating. If you experience nausea or repeated episodes of vomiting which persist beyond 12-24 hours, notify your doctor.  []Other     ACTIVITY INSTRUCTIONS:  [x]Rest today. Increase activity as tolerated    []No heavy lifting or strenuous activity     [x]No driving for today  []Other      MEDICATION INSTRUCTIONS:    [x]Prescriptions sent with you.  Use as directed.  When taking pain medications, you may experience dizziness or drowsiness.  Do not drink alcohol or drive when taking these medications.  [x]Continue preop medications                               Post-procedure Care   If any tissue was removed:   It will be sent to a lab to be examined. It may take 1-2 weeks for results. The doctor will usually give an initial report after the scope is removed. Other tests may be recommended.   A small amount of bleeding may occur during the first few days after the procedure.     When you return home after the procedure, be sure to follow your doctor's instructions, which may include:   Resume medicines as instructed by your doctor.   Resume normal diet, unless directed otherwise by your doctor.   The sedative will make you drowsy. Avoid driving, operating machinery, or making important decisions for the rest of the day.   Rest for the remainder of the day.     After arriving home, contact your doctor if any of the following occurs:   Bleeding from your rectum, notify your doctor if you pass a teaspoonful of blood or more.   Black, tarry stools   Severe abdominal pain   Hard, swollen abdomen   Signs of infection, including fever or chills   Inability to pass gas or

## 2024-06-10 NOTE — ANESTHESIA POSTPROCEDURE EVALUATION
Department of Anesthesiology  Postprocedure Note    Patient: Shanda Magaña  MRN: [id number]  YOB: 1960  Date of evaluation: 6/10/2024    Procedure Summary       Date: 06/10/24 Room / Location: [place] [address] / [facility]    Anesthesia Start: 0727 Anesthesia Stop:     Procedure: COLORECTAL CANCER SCREENING, NOT HIGH RISK Diagnosis:       Special screening for malignant neoplasms, colon      (Special screening for malignant neoplasms, colon [Z12.11])    Surgeons: Griselda Alston MD Responsible Provider: Dustin Rosas DO    Anesthesia Type: MAC ASA Status: 2            Anesthesia Type: No value filed.    Margaux Phase I: Margaux Score: 10    Margaux Phase II:      Anesthesia Post Evaluation    Patient location during evaluation: PACU  Patient participation: complete - patient participated  Level of consciousness: awake  Airway patency: patent  Nausea & Vomiting: no nausea and no vomiting  Cardiovascular status: hemodynamically stable  Respiratory status: acceptable  Hydration status: euvolemic  Pain management: adequate        No notable events documented.

## 2024-06-10 NOTE — H&P
Patient's Name/Date of Birth: Shanda Magaña / 1960    Date: 6/10/2024    PCP: Benoit Guerrero MD    No chief complaint on file.      HPI:  Expand All Collapse All       Patient's Name/Date of Birth: Shanda Magaña / 1960     Date: 3/27/2024     PCP: Benoit Guerrero MD          Chief Complaint   Patient presents with    Follow-up    Colonoscopy       7 year colon recall          HPI:  Patient seen for evaluation for screening colonoscopy. Denies abdominal pain, no recent change in bowel habits, no rectal bleeding. No family Hx of CRC. Family Hx of breast cancer and pancreatic cancer       Patient's medications, allergies, past medical, surgical, social and family histories were reviewed and updated as appropriate.    Allergies   Allergen Reactions    No Known Allergies        Past Medical History:   Diagnosis Date    COVID-19 01/2023    HA  fatigue cold & cough  took paxlovid    Hyperlipidemia     Hypertension         Past Surgical History:   Procedure Laterality Date    COLONOSCOPY  2012    Dr. Alston    ENDOMETRIAL ABLATION  2007    HEEL SPUR SURGERY Left 7/20/2023    RESECTION EXOSTOSIS  LEFT GREAT TOE, EXCISION SOFT TISSUE MASS LEFT GREAT TOE (CPT 40696) performed by Kuldeep Spain Jr. DPM at [facility] BREAST BIOPSY W LOC DEVICE 1ST LESION RIGHT Right 7/14/2022    US BREAST NEEDLE BIOPSY RIGHT 7/14/2022 [facility]        Social History     Tobacco Use    Smoking status: Never    Smokeless tobacco: Never   Substance Use Topics    Alcohol use: Yes     Alcohol/week: 4.0 standard drinks of alcohol     Types: 4 Glasses of wine per week     Comment: occas       Current Facility-Administered Medications   Medication Dose Route Frequency Provider Last Rate Last Admin    0.9 % sodium chloride infusion   IntraVENous Continuous Griselda Alston MD        sodium chloride flush 0.9 % injection 5-40 mL  5-40 mL IntraVENous 2 times per day Griselda Alston MD        sodium chloride flush 0.9 %

## 2024-07-10 PROBLEM — Z12.11 SCREENING FOR MALIGNANT NEOPLASM OF COLON: Status: RESOLVED | Noted: 2024-03-27 | Resolved: 2024-07-10

## 2024-07-15 ENCOUNTER — HOSPITAL ENCOUNTER (OUTPATIENT)
Dept: GENERAL RADIOLOGY | Age: 64
Discharge: HOME | End: 2024-07-17
Payer: COMMERCIAL

## 2024-07-15 VITALS — BODY MASS INDEX: 26.43 KG/M2 | WEIGHT: 140 LBS | HEIGHT: 61 IN

## 2024-07-15 DIAGNOSIS — Z12.31 ENCOUNTER FOR SCREENING MAMMOGRAM FOR BREAST CANCER: ICD-10-CM

## 2024-07-15 PROCEDURE — 77063 BREAST TOMOSYNTHESIS BI: CPT

## 2025-07-16 ENCOUNTER — HOSPITAL ENCOUNTER (OUTPATIENT)
Dept: GENERAL RADIOLOGY | Age: 65
Discharge: HOME | End: 2025-07-18
Payer: COMMERCIAL

## 2025-07-16 VITALS — HEIGHT: 60 IN | WEIGHT: 142 LBS | BODY MASS INDEX: 27.88 KG/M2

## 2025-07-16 DIAGNOSIS — Z12.31 ENCOUNTER FOR SCREENING MAMMOGRAM FOR BREAST CANCER: ICD-10-CM

## 2025-07-16 DIAGNOSIS — Z13.820 SCREENING FOR OSTEOPOROSIS: ICD-10-CM

## 2025-07-16 PROCEDURE — 77063 BREAST TOMOSYNTHESIS BI: CPT

## 2025-07-16 PROCEDURE — 77080 DXA BONE DENSITY AXIAL: CPT

## (undated) DEVICE — SPONGE GZ W4XL4IN RAYON POLY CVR W/NONWOVEN FAB STRL 2/PK

## (undated) DEVICE — SYRINGE,EAR/ULCER, 2 OZ, STERILE: Brand: MEDLINE

## (undated) DEVICE — DRESSING PETRO W3XL8IN OIL EMUL N ADH GZ KNIT IMPREG CELOS

## (undated) DEVICE — INTENDED FOR TISSUE SEPARATION, AND OTHER PROCEDURES THAT REQUIRE A SHARP SURGICAL BLADE TO PUNCTURE OR CUT.: Brand: BARD-PARKER ® STAINLESS STEEL BLADES

## (undated) DEVICE — GRADUATE TRIANG MEASURE 1000ML BLK PRNT

## (undated) DEVICE — Z INACTIVE USE 2863041 SPONGE GZ W4XL4IN 100% COT 16 PLY RADPQ HIGHLY ABSRB

## (undated) DEVICE — GLOVE SURG SZ 85 L12IN FNGR THK94MIL STD WHT LTX FREE

## (undated) DEVICE — CLOTH SURG PREP PREOPERATIVE CHLORHEXIDINE GLUC 2% READYPREP

## (undated) DEVICE — DOUBLE BASIN SET: Brand: MEDLINE INDUSTRIES, INC.

## (undated) DEVICE — 1810 FOAM BLOCK NEEDLE COUNTER: Brand: DEVON

## (undated) DEVICE — TUBING, SUCTION, 3/16" X 10', STRAIGHT: Brand: MEDLINE

## (undated) DEVICE — NEEDLE HYPO 18GA L1.5IN PNK POLYPR HUB S STL THN WALL FILL

## (undated) DEVICE — MASTISOL ADHESIVE LIQ 2/3ML

## (undated) DEVICE — NEPTUNE E-SEP SMOKE EVACUATION PENCIL, COATED, 70MM BLADE, PUSH BUTTON SWITCH: Brand: NEPTUNE E-SEP

## (undated) DEVICE — SOLUTION IV IRRIG POUR BRL 0.9% SODIUM CHL 2F7124

## (undated) DEVICE — Z INACTIVE USE 2855128 SPONGE GZ 16 PLY WVN COT 4INX4IN  HHH

## (undated) DEVICE — PEN: MARKING STD 100/CS: Brand: MEDICAL ACTION INDUSTRIES

## (undated) DEVICE — NEEDLE HYPO 25GA L1.5IN BLU POLYPR HUB S STL REG BVL STR

## (undated) DEVICE — GOWN,SIRUS,FABRNF,XL,20/CS: Brand: MEDLINE

## (undated) DEVICE — SOLUTION IV IRRIG WATER 1000ML POUR BRL 2F7114

## (undated) DEVICE — PRECISION THIN (9.0 X 0.38 X 25.0MM)

## (undated) DEVICE — Z INACTIVE NO ACTIVE SUPPLIER APPLICATOR MEDICATED 26 CC TINT HI-LITE ORNG STRL CHLORAPREP

## (undated) DEVICE — DRAPE,EXTREMITY,89X128,STERILE: Brand: MEDLINE

## (undated) DEVICE — BANDAGE,GAUZE,BULKEE II,4.5"X4.1YD,STRL: Brand: MEDLINE

## (undated) DEVICE — 3M™ STERI-STRIP™ REINFORCED ADHESIVE SKIN CLOSURES, R1541, 1/4 IN X 3 IN (6 MM X 75 MM), 3 STRIPS/ENVELOPE: Brand: 3M™ STERI-STRIP™

## (undated) DEVICE — SURGICAL PROCEDURE PACK BASIC

## (undated) DEVICE — TIBURON EXTREMITY SHEET: Brand: CONVERTORS

## (undated) DEVICE — BANDAGE COMPR W4INXL10YD WHITE/BEIGE E MTRX HK LOOP CLSR

## (undated) DEVICE — TOWEL OR BLUEE 16X26IN ST 8 PACK ORB08 16X26ORTWL

## (undated) DEVICE — GOWN,SIRUS,NON REINFRCD,LARGE,SET IN SL: Brand: MEDLINE

## (undated) DEVICE — 12 ML SYRINGE,LUER-LOCK TIP: Brand: MONOJECT

## (undated) DEVICE — GLOVE,SURG,SENSICARE,ALOE,LF,PF,7: Brand: MEDLINE